# Patient Record
Sex: FEMALE | Race: BLACK OR AFRICAN AMERICAN | Employment: FULL TIME | URBAN - METROPOLITAN AREA
[De-identification: names, ages, dates, MRNs, and addresses within clinical notes are randomized per-mention and may not be internally consistent; named-entity substitution may affect disease eponyms.]

---

## 2017-06-04 ENCOUNTER — OP HISTORICAL/CONVERTED ENCOUNTER (OUTPATIENT)
Dept: OTHER | Age: 27
End: 2017-06-04

## 2017-06-12 ENCOUNTER — IP HISTORICAL/CONVERTED ENCOUNTER (OUTPATIENT)
Dept: OTHER | Age: 27
End: 2017-06-12

## 2017-09-05 ENCOUNTER — ED HISTORICAL/CONVERTED ENCOUNTER (OUTPATIENT)
Dept: OTHER | Age: 27
End: 2017-09-05

## 2018-01-08 ENCOUNTER — ED HISTORICAL/CONVERTED ENCOUNTER (OUTPATIENT)
Dept: OTHER | Age: 28
End: 2018-01-08

## 2018-06-11 ENCOUNTER — ED HISTORICAL/CONVERTED ENCOUNTER (OUTPATIENT)
Dept: OTHER | Age: 28
End: 2018-06-11

## 2018-10-14 ENCOUNTER — ED HISTORICAL/CONVERTED ENCOUNTER (OUTPATIENT)
Dept: OTHER | Age: 28
End: 2018-10-14

## 2019-07-02 ENCOUNTER — ED HISTORICAL/CONVERTED ENCOUNTER (OUTPATIENT)
Dept: OTHER | Age: 29
End: 2019-07-02

## 2019-07-03 ENCOUNTER — ED HISTORICAL/CONVERTED ENCOUNTER (OUTPATIENT)
Dept: OTHER | Age: 29
End: 2019-07-03

## 2019-10-25 ENCOUNTER — ED HISTORICAL/CONVERTED ENCOUNTER (OUTPATIENT)
Dept: OTHER | Age: 29
End: 2019-10-25

## 2020-01-15 ENCOUNTER — ED HISTORICAL/CONVERTED ENCOUNTER (OUTPATIENT)
Dept: OTHER | Age: 30
End: 2020-01-15

## 2021-10-08 ENCOUNTER — HOSPITAL ENCOUNTER (EMERGENCY)
Age: 31
Discharge: HOME | End: 2021-10-08
Attending: FAMILY MEDICINE | Admitting: FAMILY MEDICINE
Payer: MEDICAID

## 2021-10-08 ENCOUNTER — APPOINTMENT (OUTPATIENT)
Dept: GENERAL RADIOLOGY | Age: 31
End: 2021-10-08
Attending: FAMILY MEDICINE
Payer: MEDICAID

## 2021-10-08 VITALS
SYSTOLIC BLOOD PRESSURE: 170 MMHG | BODY MASS INDEX: 24.16 KG/M2 | RESPIRATION RATE: 18 BRPM | TEMPERATURE: 98.1 F | DIASTOLIC BLOOD PRESSURE: 107 MMHG | OXYGEN SATURATION: 100 % | WEIGHT: 145 LBS | HEIGHT: 65 IN | HEART RATE: 89 BPM

## 2021-10-08 DIAGNOSIS — J01.10 ACUTE NON-RECURRENT FRONTAL SINUSITIS: Primary | ICD-10-CM

## 2021-10-08 LAB
DEPRECATED S PYO AG THROAT QL EIA: NEGATIVE
FLUAV AG NPH QL IA: NEGATIVE
FLUBV AG NOSE QL IA: NEGATIVE
HCG UR QL: NEGATIVE

## 2021-10-08 PROCEDURE — 87880 STREP A ASSAY W/OPTIC: CPT

## 2021-10-08 PROCEDURE — 87070 CULTURE OTHR SPECIMN AEROBIC: CPT

## 2021-10-08 PROCEDURE — 87804 INFLUENZA ASSAY W/OPTIC: CPT

## 2021-10-08 PROCEDURE — 99283 EMERGENCY DEPT VISIT LOW MDM: CPT

## 2021-10-08 PROCEDURE — 81025 URINE PREGNANCY TEST: CPT

## 2021-10-08 RX ORDER — FLUTICASONE PROPIONATE 50 MCG
2 SPRAY, SUSPENSION (ML) NASAL DAILY
Qty: 1 EACH | Refills: 0 | Status: SHIPPED | OUTPATIENT
Start: 2021-10-08

## 2021-10-08 RX ORDER — CLONIDINE HYDROCHLORIDE 0.1 MG/1
0.1 TABLET ORAL
Status: DISCONTINUED | OUTPATIENT
Start: 2021-10-08 | End: 2021-10-08

## 2021-10-08 RX ORDER — AMOXICILLIN 500 MG/1
500 TABLET, FILM COATED ORAL 3 TIMES DAILY
Qty: 15 TABLET | Refills: 0 | Status: SHIPPED | OUTPATIENT
Start: 2021-10-08 | End: 2021-10-13

## 2021-10-08 NOTE — ED PROVIDER NOTES
EMERGENCY DEPARTMENT HISTORY AND PHYSICAL EXAM      Date: 10/8/2021  Patient Name: Yoan Carrasquillo    History of Presenting Illness     Chief Complaint   Patient presents with    Nasal Congestion    Chest Congestion    Sore Throat    Ear Pain    Generalized Body Aches       History Provided By: Patient    HPI: Yoan Carrasquillo, 31 y.o. female presents to the ED with cc of flulike symptoms. Began yesterday. She reports having nasal congestion, nonproductive coughing, sore throat, stuffy ears, sinus pain and pressure, generalized body aches. She recently been tested for Covid and was negative. She was in taking Tylenol Cold and sinus since yesterday but provides no relief. Notes symptoms has not worsened since onset. Other contacts in the house with similar symptoms but they to have been tested negative for Covid. She had 1 of 2 the Covid vaccinations. No loss of taste and smell, diarrhea, dyspnea, chest pain, loss of hearing, headache, dizziness, and all other symptoms are negative. There are no other complaints, changes, or physical findings at this time. PCP: None    No current facility-administered medications on file prior to encounter. No current outpatient medications on file prior to encounter. Past History     Past Medical History:  Past Medical History:   Diagnosis Date    Hypertension        Past Surgical History:  Past Surgical History:   Procedure Laterality Date    HX GYN      C sections x 5       Family History:  History reviewed. No pertinent family history. Social History:  Social History     Tobacco Use    Smoking status: Current Every Day Smoker     Packs/day: 0.50    Smokeless tobacco: Never Used   Substance Use Topics    Alcohol use: Yes     Comment: weekend    Drug use: Yes     Types: Marijuana     Comment: every other day       Allergies:  No Known Allergies      Review of Systems     Review of Systems   Constitutional: Negative for chills and fever.    HENT: Positive for ear pain, rhinorrhea, sinus pressure, sinus pain, sneezing and sore throat. Negative for congestion. Eyes: Negative for photophobia and visual disturbance. Respiratory: Positive for cough. Negative for shortness of breath. Cardiovascular: Negative for chest pain and palpitations. Gastrointestinal: Negative for nausea and vomiting. Genitourinary: Negative for dysuria and flank pain. Musculoskeletal: Negative for arthralgias, back pain and myalgias. Skin: Negative for rash and wound. Allergic/Immunologic: Negative for environmental allergies and food allergies. Neurological: Negative for light-headedness and headaches. All other systems reviewed and are negative. Physical Exam     Physical Exam  Vitals and nursing note reviewed. Constitutional:       Appearance: Normal appearance. She is normal weight. HENT:      Head: Normocephalic and atraumatic. Right Ear: Tympanic membrane, ear canal and external ear normal.      Left Ear: Tympanic membrane, ear canal and external ear normal.      Nose: Congestion present. No rhinorrhea. Right Sinus: Maxillary sinus tenderness and frontal sinus tenderness present. Left Sinus: Maxillary sinus tenderness and frontal sinus tenderness present. Mouth/Throat:      Mouth: Mucous membranes are moist.      Pharynx: Oropharynx is clear. No oropharyngeal exudate or posterior oropharyngeal erythema. Eyes:      Extraocular Movements: Extraocular movements intact. Conjunctiva/sclera: Conjunctivae normal.   Cardiovascular:      Rate and Rhythm: Normal rate and regular rhythm. Pulses: Normal pulses. Heart sounds: Normal heart sounds. Pulmonary:      Effort: Pulmonary effort is normal.      Breath sounds: Normal breath sounds. Musculoskeletal:      Cervical back: Normal range of motion and neck supple. No rigidity. No muscular tenderness. Skin:     General: Skin is warm.       Capillary Refill: Capillary refill takes less than 2 seconds. Neurological:      General: No focal deficit present. Mental Status: She is alert and oriented to person, place, and time. Psychiatric:         Mood and Affect: Mood normal.         Behavior: Behavior normal.         Thought Content: Thought content normal.         Judgment: Judgment normal.         Lab and Diagnostic Study Results     Labs -     Recent Results (from the past 12 hour(s))   STREP AG SCREEN, GROUP A    Collection Time: 10/08/21 10:15 AM    Specimen: Throat   Result Value Ref Range    Group A Strep Ag ID Negative     HCG URINE, QL    Collection Time: 10/08/21 10:15 AM   Result Value Ref Range    HCG urine, QL Negative Negative         Radiologic Studies -   @lastxrresult@  CT Results  (Last 48 hours)    None        CXR Results  (Last 48 hours)    None            Medical Decision Making   - I am the first provider for this patient. - I reviewed the vital signs, available nursing notes, past medical history, past surgical history, family history and social history. - Initial assessment performed. The patients presenting problems have been discussed, and they are in agreement with the care plan formulated and outlined with them. I have encouraged them to ask questions as they arise throughout their visit. Vital Signs-Reviewed the patient's vital signs. Patient Vitals for the past 12 hrs:   Temp Pulse Resp BP SpO2   10/08/21 0946 98.1 °F (36.7 °C) 89 18 (!) 170/107 100 %       Records Reviewed: Nursing Notes    ED Course:          Provider Notes (Medical Decision Making):     MDM  Number of Diagnoses or Management Options  Risk of Complications, Morbidity, and/or Mortality  General comments: 10:39 AM  Patient is stable with no marked toxicity or distress. Per H&P is likely upper respiratory viral illness versus sinusitis. All questions were answered and there are no apparent barriers to comprehension or communication.  The patient verbalized agreement with the diagnosis, treatment plan, and understanding of the follow-up instructions. The patient is appropriate for discharge; leaves the Emergency Department walking with a stable gait. Patient understands to return to the ED in 12-24 hours for any new or worsening symptoms or no  expected timely resolution of symptoms on mediations prescribed. Procedures   Medical Decision Makingedical Decision Making  Performed by: Jordan Primrose, DO  PROCEDURES:  Procedures       Disposition   Disposition: Condition stable  DC- Adult Discharges: All of the diagnostic tests were reviewed and questions answered. Diagnosis, care plan and treatment options were discussed. The patient understands the instructions and will follow up as directed. The patients results have been reviewed with them. They have been counseled regarding their diagnosis. The patient verbally convey understanding and agreement of the signs, symptoms, diagnosis, treatment and prognosis and additionally agrees to follow up as recommended with their PCP in 24 - 48 hours. They also agree with the care-plan and convey that all of their questions have been answered. I have also put together some discharge instructions for them that include: 1) educational information regarding their diagnosis, 2) how to care for their diagnosis at home, as well a 3) list of reasons why they would want to return to the ED prior to their follow-up appointment, should their condition change. Discharged    DISCHARGE PLAN:  1. There are no discharge medications for this patient. 2.   Follow-up Information     Follow up With Specialties Details Why Vandana Rai MD Family Medicine In 3 days If symptoms worsen [address] [facility] [address]  [phone]          3. Return to ED if worse   4.    Current Discharge Medication List      START taking these medications    Details   amoxicillin 500 mg tab Take 500 mg by mouth three (3) times daily for 5 days. Qty: 15 Tablet, Refills: 0  Start date: 10/8/2021, End date: 10/13/2021      fluticasone propionate (FLONASE) 50 mcg/actuation nasal spray 2 Sprays by Both Nostrils route daily. Qty: 1 Each, Refills: 0  Start date: 10/8/2021               Diagnosis     Clinical Impression:   1. Acute non-recurrent frontal sinusitis        Attestations:    Sesar Barnett, DO    Please note that this dictation was completed with [employer], the [employer] voice recognition software. Quite often unanticipated grammatical, syntax, homophones, and other interpretive errors are inadvertently transcribed by the computer software. Please disregard these errors. Please excuse any errors that have escaped final proofreading. Thank you.

## 2021-10-08 NOTE — ED TRIAGE NOTES
Kids were sick 1 wk ago, pt symptoms started today non productive cough, body aches, sore throat, chest and nasal congestion. Tested negative for Friday last week.   Still has smell and taste, no N, V, or diarrhea

## 2021-10-08 NOTE — LETTER
[address] [facility] EMERGENCY DEPARTMENT  [address] [facility] [address]  [phone]    Work/School Note    Date: 10/8/2021    To Whom It May concern:    Deepthi Argueta was seen and treated today in the emergency room by the following provider(s):  Attending Provider: Julio Leo can return to work on 10-    Sincerely,          Francisco Javier Montoya RN

## 2021-10-09 LAB
BACTERIA SPEC CULT: NORMAL
SPECIAL REQUESTS,SREQ: NORMAL

## 2022-02-12 ENCOUNTER — HOSPITAL ENCOUNTER (EMERGENCY)
Age: 32
Discharge: HOME | End: 2022-02-12
Attending: EMERGENCY MEDICINE | Admitting: EMERGENCY MEDICINE
Payer: MEDICAID

## 2022-02-12 VITALS
TEMPERATURE: 98.5 F | RESPIRATION RATE: 18 BRPM | SYSTOLIC BLOOD PRESSURE: 169 MMHG | OXYGEN SATURATION: 100 % | HEART RATE: 71 BPM | DIASTOLIC BLOOD PRESSURE: 114 MMHG

## 2022-02-12 DIAGNOSIS — M75.51 ACUTE BURSITIS OF RIGHT SHOULDER: Primary | ICD-10-CM

## 2022-02-12 PROCEDURE — 74011250637 HC RX REV CODE- 250/637: Performed by: EMERGENCY MEDICINE

## 2022-02-12 PROCEDURE — 74011250636 HC RX REV CODE- 250/636: Performed by: EMERGENCY MEDICINE

## 2022-02-12 PROCEDURE — 99282 EMERGENCY DEPT VISIT SF MDM: CPT

## 2022-02-12 PROCEDURE — 96372 THER/PROPH/DIAG INJ SC/IM: CPT

## 2022-02-12 RX ORDER — KETOROLAC TROMETHAMINE 30 MG/ML
60 INJECTION, SOLUTION INTRAMUSCULAR; INTRAVENOUS
Status: COMPLETED | OUTPATIENT
Start: 2022-02-12 | End: 2022-02-12

## 2022-02-12 RX ORDER — IBUPROFEN 800 MG/1
800 TABLET ORAL
Qty: 20 TABLET | Refills: 0 | Status: SHIPPED | OUTPATIENT
Start: 2022-02-12 | End: 2022-02-19

## 2022-02-12 RX ORDER — METOPROLOL TARTRATE 50 MG/1
50 TABLET ORAL
Status: COMPLETED | OUTPATIENT
Start: 2022-02-12 | End: 2022-02-12

## 2022-02-12 RX ADMIN — KETOROLAC TROMETHAMINE 60 MG: 30 INJECTION, SOLUTION INTRAMUSCULAR at 22:33

## 2022-02-12 RX ADMIN — METOPROLOL TARTRATE 50 MG: 50 TABLET, FILM COATED ORAL at 22:34

## 2022-02-12 NOTE — Clinical Note
[address]  [facility] EMERGENCY DEPT  [address] [address]  [phone]    Work/School Note    Date: 2/12/2022    To Whom It May concern:    Francoise Riley was seen and treated today in the emergency room by the following provider(s):  Attending Provider: Irma Zimmerman MD.      Francoise Riley is excused from work/school on 02/12/22 and 02/13/22. She is medically clear to return to work/school on 2/14/2022.        Sincerely,          Benjie Johnston MD
[address]  [facility] EMERGENCY DEPT  [address] [address]  [phone]    Work/School Note    Date: 2/12/2022    To Whom It May concern:    Joon Kim was seen and treated today in the emergency room by the following provider(s):  Attending Provider: Marielos Garner MD.      Joon Kim is excused from work/school on 02/12/22 and 02/13/22. She is medically clear to return to work/school on 2/14/2022.        Sincerely,          Antonio Giron RN
Adequate: hears normal conversation without difficulty

## 2022-02-13 NOTE — ED PROVIDER NOTES
Patient presents with complaint of right shoulder pain for 2 days . Pain is moderate. Worse with movement or palpation . No relief with OTC Tylenol. Past Medical History:   Diagnosis Date    Hypertension        Past Surgical History:   Procedure Laterality Date    HX GYN      C sections x 5         No family history on file. Social History     Socioeconomic History    Marital status: SINGLE     Spouse name: Not on file    Number of children: Not on file    Years of education: Not on file    Highest education level: Not on file   Occupational History    Not on file   Tobacco Use    Smoking status: Current Every Day Smoker     Packs/day: 0.50    Smokeless tobacco: Never Used   Substance and Sexual Activity    Alcohol use: Yes     Comment: weekend    Drug use: Yes     Types: Marijuana     Comment: every other day    Sexual activity: Not on file   Other Topics Concern    Not on file   Social History Narrative    Not on file     Social Determinants of Health     Financial Resource Strain:     Difficulty of Paying Living Expenses: Not on file   Food Insecurity:     Worried About Running Out of Food in the Last Year: Not on file    Danielle of Food in the Last Year: Not on file   Transportation Needs:     Lack of Transportation (Medical): Not on file    Lack of Transportation (Non-Medical):  Not on file   Physical Activity:     Days of Exercise per Week: Not on file    Minutes of Exercise per Session: Not on file   Stress:     Feeling of Stress : Not on file   Social Connections:     Frequency of Communication with Friends and Family: Not on file    Frequency of Social Gatherings with Friends and Family: Not on file    Attends Religion Services: Not on file    Active Member of Clubs or Organizations: Not on file    Attends Club or Organization Meetings: Not on file    Marital Status: Not on file   Intimate Partner Violence:     Fear of Current or Ex-Partner: Not on file   [employer] Emotionally Abused: Not on file    Physically Abused: Not on file    Sexually Abused: Not on file   Housing Stability:     Unable to Pay for Housing in the Last Year: Not on file    Number of Places Lived in the Last Year: Not on file    Unstable Housing in the Last Year: Not on file         ALLERGIES: Patient has no known allergies. Review of Systems   Constitutional: Negative. HENT: Negative. Eyes: Negative. Respiratory: Negative. Cardiovascular: Negative. Gastrointestinal: Negative. Endocrine: Negative. Genitourinary: Negative. Musculoskeletal:        Right shoulder pain. Skin: Negative. Allergic/Immunologic: Negative. Neurological: Negative. Hematological: Negative. Psychiatric/Behavioral: Negative. All other systems reviewed and are negative. Vitals:    02/12/22 2212   BP: (!) 174/118   Pulse: 88   Resp: 18   Temp: 98.5 °F (36.9 °C)   SpO2: 100%            Physical Exam  Vitals and nursing note reviewed. Constitutional:       Appearance: She is well-developed. HENT:      Head: Normocephalic and atraumatic. Cardiovascular:      Rate and Rhythm: Normal rate and regular rhythm. Heart sounds: Normal heart sounds. Pulmonary:      Breath sounds: Normal breath sounds. Abdominal:      General: Bowel sounds are normal.      Palpations: Abdomen is soft. Musculoskeletal:         General: Tenderness present. No swelling or deformity. Normal range of motion. Cervical back: Normal range of motion and neck supple. Comments: Right shoulder tender to palpation with mild decreased ROM. Neurological:      General: No focal deficit present. Mental Status: She is alert.    Psychiatric:         Mood and Affect: Mood normal.         Behavior: Behavior normal.          MDM       Procedures

## 2022-02-13 NOTE — ED NOTES
Nurse asked Dr if pt is ok to go home with 169/114 BP. Per . Gil Oswald is ok to go home as she received med to help with it and still will kick in.

## 2023-05-16 RX ORDER — FLUTICASONE PROPIONATE 50 MCG
2 SPRAY, SUSPENSION (ML) NASAL DAILY
COMMUNITY
Start: 2021-10-08

## 2023-07-03 ENCOUNTER — HOSPITAL ENCOUNTER (EMERGENCY)
Facility: HOSPITAL | Age: 33
Discharge: HOME | End: 2023-07-03
Payer: OTHER MISCELLANEOUS

## 2023-07-03 VITALS
HEART RATE: 72 BPM | SYSTOLIC BLOOD PRESSURE: 179 MMHG | RESPIRATION RATE: 20 BRPM | BODY MASS INDEX: 27.31 KG/M2 | WEIGHT: 160 LBS | DIASTOLIC BLOOD PRESSURE: 113 MMHG | HEIGHT: 64 IN | OXYGEN SATURATION: 99 % | TEMPERATURE: 98.6 F
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
## 2023-07-03 DIAGNOSIS — S39.012A STRAIN OF LUMBAR REGION, INITIAL ENCOUNTER: ICD-10-CM

## 2023-07-03 DIAGNOSIS — S16.1XXA STRAIN OF NECK MUSCLE, INITIAL ENCOUNTER: ICD-10-CM

## 2023-07-03 PROCEDURE — 6370000000 HC RX 637 (ALT 250 FOR IP)

## 2023-07-03 PROCEDURE — 99283 EMERGENCY DEPT VISIT LOW MDM: CPT

## 2023-07-03 RX ORDER — IBUPROFEN 600 MG/1
600 TABLET ORAL
Status: COMPLETED | OUTPATIENT
Start: 2023-07-03 | End: 2023-07-03

## 2023-07-03 RX ORDER — ACETAMINOPHEN 500 MG
1000 TABLET ORAL
Status: COMPLETED | OUTPATIENT
Start: 2023-07-03 | End: 2023-07-03

## 2023-07-03 RX ORDER — CYCLOBENZAPRINE HCL 10 MG
10 TABLET ORAL 2 TIMES DAILY PRN
Qty: 10 TABLET | Refills: 0 | Status: SHIPPED | OUTPATIENT
Start: 2023-07-03 | End: 2023-07-08

## 2023-07-03 RX ORDER — IBUPROFEN 600 MG/1
600 TABLET ORAL EVERY 6 HOURS PRN
Qty: 20 TABLET | Refills: 0 | Status: SHIPPED | OUTPATIENT
Start: 2023-07-03 | End: 2023-07-08

## 2023-07-03 RX ORDER — CYCLOBENZAPRINE HCL 10 MG
10 TABLET ORAL
Status: COMPLETED | OUTPATIENT
Start: 2023-07-03 | End: 2023-07-03

## 2023-07-03 RX ORDER — ACETAMINOPHEN 500 MG
1000 TABLET ORAL EVERY 6 HOURS PRN
Qty: 40 TABLET | Refills: 0 | Status: SHIPPED | OUTPATIENT
Start: 2023-07-03 | End: 2023-07-08

## 2023-07-03 RX ADMIN — IBUPROFEN 600 MG: 600 TABLET, FILM COATED ORAL at 20:51

## 2023-07-03 RX ADMIN — ACETAMINOPHEN 1000 MG: 500 TABLET ORAL at 20:51

## 2023-07-03 RX ADMIN — CYCLOBENZAPRINE 10 MG: 10 TABLET, FILM COATED ORAL at 20:51
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
## 2023-07-04 NOTE — ED NOTES
C collar removed by provider. Patient A/O, NAD, respirations even and unlabored. Patient educated by Physician on blood pressure monitoring and following up with PCP.  Patient verbalized understanding     Jefry Gilliam RN  07/03/23 4276

## 2024-07-31 ENCOUNTER — HOSPITAL ENCOUNTER (EMERGENCY)
Facility: HOSPITAL | Age: 34
Discharge: HOME | End: 2024-07-31
Attending: STUDENT IN AN ORGANIZED HEALTH CARE EDUCATION/TRAINING PROGRAM
Payer: MEDICAID

## 2024-07-31 VITALS
OXYGEN SATURATION: 100 % | SYSTOLIC BLOOD PRESSURE: 163 MMHG | TEMPERATURE: 99.2 F | HEIGHT: 65 IN | BODY MASS INDEX: 26.16 KG/M2 | HEART RATE: 97 BPM | RESPIRATION RATE: 18 BRPM | DIASTOLIC BLOOD PRESSURE: 98 MMHG | WEIGHT: 157 LBS

## 2024-07-31 DIAGNOSIS — G56.00 MEDIAN NERVE COMPRESSION: Primary | ICD-10-CM

## 2024-07-31 PROCEDURE — 6370000000 HC RX 637 (ALT 250 FOR IP): Performed by: STUDENT IN AN ORGANIZED HEALTH CARE EDUCATION/TRAINING PROGRAM

## 2024-07-31 PROCEDURE — 99283 EMERGENCY DEPT VISIT LOW MDM: CPT

## 2024-07-31 RX ORDER — ACETAMINOPHEN 325 MG/1
650 TABLET ORAL
Status: COMPLETED | OUTPATIENT
Start: 2024-07-31 | End: 2024-07-31

## 2024-07-31 RX ORDER — IBUPROFEN 400 MG/1
400 TABLET ORAL EVERY 6 HOURS PRN
Qty: 56 TABLET | Refills: 0 | Status: SHIPPED | OUTPATIENT
Start: 2024-07-31 | End: 2024-08-14

## 2024-07-31 RX ORDER — IBUPROFEN 400 MG/1
400 TABLET ORAL
Status: COMPLETED | OUTPATIENT
Start: 2024-07-31 | End: 2024-07-31

## 2024-07-31 RX ORDER — PREDNISONE 20 MG/1
40 TABLET ORAL ONCE
Status: COMPLETED | OUTPATIENT
Start: 2024-07-31 | End: 2024-07-31

## 2024-07-31 RX ORDER — PREDNISONE 50 MG/1
50 TABLET ORAL DAILY
Qty: 5 TABLET | Refills: 0 | Status: SHIPPED | OUTPATIENT
Start: 2024-07-31 | End: 2024-08-05

## 2024-07-31 RX ORDER — ACETAMINOPHEN 500 MG
1000 TABLET ORAL 3 TIMES DAILY
Qty: 84 TABLET | Refills: 0 | Status: SHIPPED | OUTPATIENT
Start: 2024-07-31 | End: 2024-08-14

## 2024-07-31 RX ORDER — CETIRIZINE HYDROCHLORIDE 10 MG/1
10 TABLET ORAL DAILY
COMMUNITY

## 2024-07-31 RX ADMIN — PREDNISONE 40 MG: 20 TABLET ORAL at 16:46

## 2024-07-31 RX ADMIN — ACETAMINOPHEN 650 MG: 325 TABLET ORAL at 16:46

## 2024-07-31 RX ADMIN — IBUPROFEN 400 MG: 400 TABLET, FILM COATED ORAL at 16:45

## 2024-07-31 ASSESSMENT — PAIN DESCRIPTION - LOCATION: LOCATION: ARM

## 2024-07-31 ASSESSMENT — PAIN DESCRIPTION - ORIENTATION: ORIENTATION: LEFT

## 2024-07-31 ASSESSMENT — PAIN DESCRIPTION - DESCRIPTORS: DESCRIPTORS: TINGLING

## 2024-07-31 ASSESSMENT — PAIN - FUNCTIONAL ASSESSMENT: PAIN_FUNCTIONAL_ASSESSMENT: 0-10

## 2024-07-31 ASSESSMENT — LIFESTYLE VARIABLES
HOW OFTEN DO YOU HAVE A DRINK CONTAINING ALCOHOL: 2-4 TIMES A MONTH
HOW MANY STANDARD DRINKS CONTAINING ALCOHOL DO YOU HAVE ON A TYPICAL DAY: 1 OR 2

## 2024-07-31 ASSESSMENT — PAIN SCALES - GENERAL: PAINLEVEL_OUTOF10: 8

## 2024-07-31 NOTE — ED PROVIDER NOTES
RD - P [phone] - F [phone]  [address] , [facility] [address]      Phone: [phone]   acetaminophen 500 MG tablet  ibuprofen 400 MG tablet  predniSONE 50 MG tablet       5. Discontinued Medications:   Discharge Medication List as of 7/31/2024  4:47 PM          Procedures     Unless otherwise noted below, none.    Performed by: Brenden Bronson MD   Procedures      Critical Care Time     Critical care billing criteria and/or time were NOT met.    Documentation     I am the Primary Clinician of Record: Brenden Bronson MD (electronically signed)    (Please note that parts of this dictation were completed with voice recognition software. Quite often unanticipated grammatical, syntax, homophones, and other interpretive errors are inadvertently transcribed by the computer software. Please disregards these errors. Please excuse any errors that have escaped final proofreading.)       Brenden Bronson MD  07/31/24 7535

## 2024-07-31 NOTE — ED TRIAGE NOTES
Patient reporting on and off, left arm numbness/tingling  x 3 weeks.  Numbness/tingling radiates from shoulder, to elbow, to 2nd finger.     Patients believes she slept on it wrong and has a pinched a nerve.

## 2024-07-31 NOTE — DISCHARGE INSTRUCTIONS
Thank you!    Thank you for allowing me to care for you in the emergency department.  I sincerely hope that you are satisfied with your visit today.  It is my goal to provide you with excellent care.    Below you will find a list of your labs and imaging from your visit today if applicable. Should you have any questions regarding these results please do not hesitate to call the emergency department. Please review [employer] for a more detailed result list since the below list may not be comprehensive. Instructions on how to sign up to [employer] should be provided in this packet.    Labs -   No results found for this or any previous visit (from the past 12 hour(s)).    Radiologic Studies -   No orders to display     [unfilled]  [unfilled]       If you feel that you have not received excellent quality care or timely care, please ask to speak to the nurse manager. Please choose us in the future for your continued health care needs.   ------------------------------------------------------------------------------------------------------------  The exam and treatment you received in the Emergency Department were for an urgent problem and are not intended as complete care. It is very important that you follow-up with a doctor, nurse practitioner, or physician assistant in a timely manner to:  (1) confirm your diagnosis and review all imaging and lab results,  (2) re-evaluation of changes in your illness and treatment, and  (3) for ongoing care.  If your symptoms become worse or you do not improve as expected and you are unable to reach your usual health care provider, you should return to the Emergency Department. We are available 24 hours a day.     Please take your discharge instructions with you when you go to your follow-up appointment.     If a prescription has been provided, please have it filled as soon as possible to prevent a delay in treatment. Read the entire medication instruction sheet provided to you by the pharmacy. If

## 2024-08-19 ENCOUNTER — HOSPITAL ENCOUNTER (EMERGENCY)
Facility: HOSPITAL | Age: 34
Discharge: HOME | End: 2024-08-19

## 2024-08-19 VITALS
HEIGHT: 65 IN | DIASTOLIC BLOOD PRESSURE: 99 MMHG | WEIGHT: 157 LBS | RESPIRATION RATE: 17 BRPM | SYSTOLIC BLOOD PRESSURE: 156 MMHG | HEART RATE: 97 BPM | BODY MASS INDEX: 26.16 KG/M2 | OXYGEN SATURATION: 98 % | TEMPERATURE: 98.3 F

## 2024-08-19 DIAGNOSIS — Z11.52 ENCOUNTER FOR SCREENING FOR COVID-19: Primary | ICD-10-CM

## 2024-08-19 DIAGNOSIS — J06.9 VIRAL URI WITH COUGH: ICD-10-CM

## 2024-08-19 LAB
SARS-COV-2 RNA RESP QL NAA+PROBE: NOT DETECTED
SPECIMEN SOURCE: NORMAL

## 2024-08-19 PROCEDURE — 99283 EMERGENCY DEPT VISIT LOW MDM: CPT

## 2024-08-19 PROCEDURE — 87635 SARS-COV-2 COVID-19 AMP PRB: CPT

## 2024-08-19 RX ORDER — ACETAMINOPHEN 500 MG
1000 TABLET ORAL 4 TIMES DAILY PRN
Qty: 40 TABLET | Refills: 0 | Status: SHIPPED | OUTPATIENT
Start: 2024-08-19

## 2024-08-19 RX ORDER — ONDANSETRON 4 MG/1
4 TABLET, ORALLY DISINTEGRATING ORAL EVERY 8 HOURS PRN
Qty: 12 TABLET | Refills: 0 | Status: SHIPPED | OUTPATIENT
Start: 2024-08-19

## 2024-08-19 RX ORDER — IBUPROFEN 800 MG/1
800 TABLET ORAL EVERY 8 HOURS PRN
Qty: 20 TABLET | Refills: 0 | Status: SHIPPED | OUTPATIENT
Start: 2024-08-19

## 2024-08-19 RX ORDER — DEXTROMETHORPHAN HYDROBROMIDE AND PROMETHAZINE HYDROCHLORIDE 15; 6.25 MG/5ML; MG/5ML
2.5 SYRUP ORAL 4 TIMES DAILY PRN
Qty: 40 ML | Refills: 0 | Status: SHIPPED | OUTPATIENT
Start: 2024-08-19 | End: 2024-08-26

## 2024-08-19 ASSESSMENT — PAIN SCALES - GENERAL: PAINLEVEL_OUTOF10: 8

## 2024-08-19 ASSESSMENT — PAIN DESCRIPTION - DESCRIPTORS: DESCRIPTORS: ACHING

## 2024-08-19 ASSESSMENT — PAIN DESCRIPTION - LOCATION: LOCATION: HEAD

## 2024-08-19 ASSESSMENT — PAIN - FUNCTIONAL ASSESSMENT: PAIN_FUNCTIONAL_ASSESSMENT: 0-10

## 2024-08-19 ASSESSMENT — PAIN DESCRIPTION - ORIENTATION: ORIENTATION: ANTERIOR;POSTERIOR

## 2024-08-19 NOTE — ED PROVIDER NOTES
bronchitis, bacterial sinusitis vs. pharyngitis, migraine, flu.     Symptomatic therapy suggested: acetaminophen, ibuprofen, antihistamine-decongestant of choice, cough suppressant of choice. Increase fluids, use vaporizer, stay in steamy bathroom tid 15 min prn severe cough, tylenol as needed, rest, avoid smoky areas. Lack of antibiotic effectiveness discussed with her. Symptomatic therapy suggested: gargle for sore throat, use mist at bedside for congestion.  Apply facial warm packs for sinus pain or use nasal saline sprays. Follow up prn if not better in 72 hours.      Records Reviewed (source and summary of external notes): Prior medical records and Nursing notes    Vitals:    Vitals:    08/19/24 1558   BP: (!) 156/99   Pulse: 97   Resp: 17   Temp: 98.3 °F (36.8 °C)   TempSrc: Oral   SpO2: 98%   Weight: 71.2 kg (157 lb)   Height: 1.638 m (5' 4.5\")        ED COURSE  Routine discharge counseling was given including the fact that any worsening, changing or persistent symptoms should prompt an immediate call or follow up with their primary physician or the emergency department. The importance of appropriate follow up was also discussed. More extensive discharge instructions were given in the patient's discharge paperwork.     After completion of evaluation and discussion of results and diagnoses, all the questions were answered. If required, all follow up appointments and treatments were discussed and explained. Understanding was insured prior to discharge.         SEPSIS Reassessment: Sepsis reassessment not applicable    Patient was given the following medications:  Medications - No data to display    CONSULTS: See ED Course/MDM for further details.  None     Social Determinants affecting Diagnosis/Treatment: Patient lacks a PCP. Given PCP/Free clinic resources.    Smoking Cessation: Smoking Cessation Counseling  Discussed the risks of smoking tobacco products and the long term sequelae of tobacco use with the

## 2024-08-19 NOTE — DISCHARGE INSTRUCTIONS
PRIMARY CARE in [facility]:  [address] [place] [address].  [phone]  Claudia Alonso MD Family Medicine  Dhruv Oliva MD Family Medicine  Brian Love MD Family Medicine    [facility]: [address] [place] [address]. [phone]   Zaki Ramirez MD Family Medicine  Saurabh Kevin, [place] Family Medicine  Eleni Wright, [place] Family Medicine    [facility]: [address], [address], [place] [address]. [phone]  Amber Das MD Family Medicine  Adore Rodrigez MD Family Medicine  Taqueria Arce, [place] Family Medicine  Marce Yip, [place] Family Medicine    [facility]: [address] [address]. [phone]  Leandra Luevano MD Internal Medicine  Brooke Blandon MD Internal Medicine  Freedom Ray MD Internal Medicine  Coral Romo, PA Family Medicine    [facility]: [address], [place] [address]. [phone]  Bailey Rueda MD Family Medicine  Isadora Foss MD Family Medicine  Mike Hyman, DO Infectious Disease  Roger Schroeder MD Family Medicine    [facility]: [address], [address], [place] [address]. [phone]  Rose Mary Pike,  Family Medicine  Tran Arce NP Internal Medicine  Ruth Rice, [place] Internal Medicine    [facility]: [address] [place], [place] [address]. [phone]  Martine Buckner MD Internal Medicine  Phoebe Durand MD Internal Medicine    Primary Care [facility]: [address] [place] [address]. [phone]  Yahaira Sullivan MD Family Medicine  Elizabeth Hennessy MD Family Medicine  Alcon Collado MD Family Medicine  Mishel Kaur, [place] Family Medicine  Florencio Arce, ARPN, CNP Family Medicine    [facility]

## 2024-08-19 NOTE — ED TRIAGE NOTES
Pt reports COVID like symptoms with the primary complaint of headache. Pt recently in contact with individuals that tested positive for COVID.

## 2024-12-15 ENCOUNTER — HOSPITAL ENCOUNTER (EMERGENCY)
Facility: HOSPITAL | Age: 34
Discharge: HOME | End: 2024-12-15
Payer: MEDICAID

## 2024-12-15 VITALS
HEART RATE: 76 BPM | HEIGHT: 64 IN | BODY MASS INDEX: 26.95 KG/M2 | SYSTOLIC BLOOD PRESSURE: 192 MMHG | DIASTOLIC BLOOD PRESSURE: 100 MMHG | RESPIRATION RATE: 16 BRPM | TEMPERATURE: 98.2 F | OXYGEN SATURATION: 100 %

## 2024-12-15 DIAGNOSIS — L30.1 DYSHIDROTIC ECZEMA: Primary | ICD-10-CM

## 2024-12-15 PROCEDURE — 99283 EMERGENCY DEPT VISIT LOW MDM: CPT

## 2024-12-15 RX ORDER — TRIAMCINOLONE ACETONIDE 5 MG/G
CREAM TOPICAL
Qty: 15 G | Refills: 0 | Status: SHIPPED | OUTPATIENT
Start: 2024-12-15

## 2024-12-15 RX ORDER — DIPHENHYDRAMINE HCL 25 MG
50 TABLET ORAL EVERY 6 HOURS PRN
Qty: 30 TABLET | Refills: 0 | Status: SHIPPED | OUTPATIENT
Start: 2024-12-15 | End: 2025-01-14

## 2024-12-15 RX ORDER — PREDNISONE 10 MG/1
TABLET ORAL
Qty: 42 TABLET | Refills: 0 | Status: SHIPPED | OUTPATIENT
Start: 2024-12-15

## 2024-12-15 ASSESSMENT — PAIN - FUNCTIONAL ASSESSMENT: PAIN_FUNCTIONAL_ASSESSMENT: 0-10

## 2024-12-15 ASSESSMENT — PAIN SCALES - GENERAL: PAINLEVEL_OUTOF10: 0

## 2024-12-15 NOTE — ED PROVIDER NOTES
Cessation: Not Applicable    PROCEDURES   Unless otherwise noted above, none.  Procedures      CRITICAL CARE TIME   Patient does not meet Critical Care Time, 0 minutes    ED IMPRESSION     1. Dyshidrotic eczema          DISPOSITION/PLAN   DISPOSITION Decision To Discharge 12/15/2024 01:53:15 PM   DISPOSITION CONDITION Stable        Discharge Note: The patient is stable for discharge home. The signs, symptoms, diagnosis, and discharge instructions have been discussed, understanding conveyed, and agreed upon. The patient is to follow up as recommended or return to ER should their symptoms worsen.      PATIENT REFERRED TO:  [facility]  [address] [place] [id number]  [phone]            DISCHARGE MEDICATIONS:     Medication List        START taking these medications      diphenhydrAMINE 25 MG tablet  Commonly known as: Benadryl Allergy  Take 2 tablets by mouth every 6 hours as needed for Itching or Allergies     predniSONE 10 MG tablet  Commonly known as: DELTASONE  Take 5tab(50mg)x3days, 4tab(40mg)x3days, 3tab(30mg)x3days, 2tab(20mg)x2days, 1tab(10mg)x2days. #42     triamcinolone 0.5 % cream  Commonly known as: ARISTOCORT  Apply topically 3 times daily.               Where to Get Your Medications        These medications were sent to [id number] [place], [place] - [address] - P [phone] - F [phone]  [facility] [facility] [address]      Phone: [phone]   diphenhydrAMINE 25 MG tablet  predniSONE 10 MG tablet  triamcinolone 0.5 % cream           DISCONTINUED MEDICATIONS:  Discharge Medication List as of 12/15/2024  1:58 PM        STOP taking these medications       acetaminophen (TYLENOL) 500 MG tablet Comments:   Reason for Stopping:         ibuprofen (ADVIL;MOTRIN) 800 MG tablet Comments:   Reason for Stopping:         ondansetron (ZOFRAN-ODT) 4 MG disintegrating tablet Comments:   Reason for Stopping:         cetirizine (ZYRTEC) 10 MG tablet

## 2025-01-29 ENCOUNTER — HOSPITAL ENCOUNTER (EMERGENCY)
Facility: HOSPITAL | Age: 35
Discharge: HOME | End: 2025-01-29
Payer: MEDICAID

## 2025-01-29 VITALS
BODY MASS INDEX: 27.83 KG/M2 | SYSTOLIC BLOOD PRESSURE: 176 MMHG | RESPIRATION RATE: 18 BRPM | TEMPERATURE: 97.9 F | HEIGHT: 64 IN | HEART RATE: 95 BPM | DIASTOLIC BLOOD PRESSURE: 112 MMHG | WEIGHT: 163 LBS | OXYGEN SATURATION: 99 %

## 2025-01-29 DIAGNOSIS — L30.1 DYSHIDROTIC ECZEMA: Primary | ICD-10-CM

## 2025-01-29 PROCEDURE — 99283 EMERGENCY DEPT VISIT LOW MDM: CPT

## 2025-01-29 RX ORDER — TRIAMCINOLONE ACETONIDE 5 MG/G
CREAM TOPICAL
Qty: 454 G | Refills: 0 | Status: SHIPPED | OUTPATIENT
Start: 2025-01-29

## 2025-01-29 RX ORDER — PREDNISONE 10 MG/1
TABLET ORAL
Qty: 42 TABLET | Refills: 0 | Status: SHIPPED | OUTPATIENT
Start: 2025-01-29

## 2025-01-29 ASSESSMENT — PAIN - FUNCTIONAL ASSESSMENT: PAIN_FUNCTIONAL_ASSESSMENT: 0-10

## 2025-01-29 ASSESSMENT — PAIN DESCRIPTION - LOCATION: LOCATION: FINGER (COMMENT WHICH ONE)

## 2025-01-29 ASSESSMENT — PAIN SCALES - GENERAL: PAINLEVEL_OUTOF10: 6

## 2025-01-29 ASSESSMENT — PAIN DESCRIPTION - ORIENTATION: ORIENTATION: LEFT

## 2025-01-29 NOTE — ED TRIAGE NOTES
States that she has something that looks like a pus filled blister on the side of her left ring finger about midway down.  Denies burn or penetrating injury to finger.  Reports pain also

## 2025-01-29 NOTE — ED PROVIDER NOTES
176/112   Pulse: 95   Resp: 18   Temp: 97.9 °F (36.6 °C)   TempSrc: Oral   SpO2: 99%   Weight: 73.9 kg (163 lb)   Height: 1.626 m (5' 4\")        ED COURSE       SEPSIS Reassessment: Patient does NOT meet Sepsis criteria after ED workup    Clinical Management Tools:  Not Applicable    Patient was given the following medications:  Medications - No data to display    CONSULTS: See ED Course/MDM for further details.  None     Social Determinants affecting Diagnosis/Treatment: None    Smoking Cessation: Not Applicable    PROCEDURES   Unless otherwise noted above, none.  Procedures      CRITICAL CARE TIME   Patient does not meet Critical Care Time, 0 minutes    ED IMPRESSION     1. Dyshidrotic eczema          DISPOSITION/PLAN   DISPOSITION Decision To Discharge 01/29/2025 04:32:49 PM   DISPOSITION CONDITION Stable        Discharge Note: The patient is stable for discharge home. The signs, symptoms, diagnosis, and discharge instructions have been discussed, understanding conveyed, and agreed upon. The patient is to follow up as recommended or return to ER should their symptoms worsen.      PATIENT REFERRED TO:  [facility]  [address] [place] [address] A  [place] [address]  [phone]  Schedule an appointment as soon as possible for a visit in 1 week  Follow-up for repeat evaluation and treatment recommendations for Dyshidrotic eczema    [facility] and [address], [place] [address]    Phone: [phone]  Schedule an appointment as soon as possible for a visit   OR  your PCP for repeat evaluation of        DISCHARGE MEDICATIONS:     Medication List        CONTINUE taking these medications      predniSONE 10 MG tablet  Commonly known as: DELTASONE  Take 5tab(50mg)x3days, 4tab(40mg)x3days, 3tab(30mg)x3days, 2tab(20mg)x2days, 1tab(10mg)x2days. #42     triamcinolone 0.5 % cream  Commonly known as: ARISTOCORT  Apply topically 3 times daily.               Where to Get

## 2025-01-29 NOTE — DISCHARGE INSTRUCTIONS
PRIMARY CARE in [facility]:  [address] [place] [address].  [phone]  Claudia Alonso MD Family Medicine  Dhruv Oliva MD Family Medicine  Brian Love MD Family Medicine    [facility]: [address] [place] [address]. [phone]   Zaki Ramirez MD Family Medicine  Saurabh Kevin, [facility] Family Medicine  Eleni Wright, [facility] Family Medicine    [facility] [place] [facility]: [address], [address], [place] [address]. [phone]  Amber Das MD Family Medicine  Adore Rodrigez MD Family Medicine  Taqueria Arce, [facility] Family Medicine  Marce Yip, [facility] Family Medicine    [facility]: [address] [facility] [address]. [phone]  Leandra Luevano MD Internal Medicine  Brooke Blandon MD Internal Medicine  Freedom Ray MD Internal Medicine  Coral Romo, PA Family Medicine    [facility]: [address], [place] [address]. [phone]  Bailey Rueda MD Family Medicine  Isadora Foss MD Family Medicine  Mike Hyman, DO Infectious Disease  Roger Schroeder MD Family Medicine    [facility]: [address], [address], [place] [address]. [phone]  Rose Mary Pike,  Family Medicine  Tran Arce NP Internal Medicine  Ruth Rice, [facility]KA Internal Medicine    [facility]: [address] [place], [place] [address]. [phone]  Martine Buckner MD Internal Medicine  Phoebe Durand MD Internal Medicine    Primary Care [facility]: [address] [place] [address]. [phone]  Yahaira Sullivan MD Family Medicine  Elizabeth Hennessy MD Family Medicine  Alcon Collado MD Family Medicine  Mishel Kaur, [facility] Family Medicine  Florencio Arce, ARPN, CNP Family Medicine    [facility]

## 2025-03-04 ENCOUNTER — TELEPHONE (OUTPATIENT)
Age: 35
End: 2025-03-04

## 2025-04-14 ENCOUNTER — APPOINTMENT (OUTPATIENT)
Facility: HOSPITAL | Age: 35
End: 2025-04-14
Payer: MEDICAID

## 2025-04-14 ENCOUNTER — HOSPITAL ENCOUNTER (EMERGENCY)
Facility: HOSPITAL | Age: 35
Discharge: HOME | End: 2025-04-14
Payer: MEDICAID

## 2025-04-14 VITALS
HEART RATE: 94 BPM | OXYGEN SATURATION: 100 % | HEIGHT: 60 IN | TEMPERATURE: 98.1 F | DIASTOLIC BLOOD PRESSURE: 119 MMHG | RESPIRATION RATE: 18 BRPM | SYSTOLIC BLOOD PRESSURE: 165 MMHG | BODY MASS INDEX: 30.23 KG/M2 | WEIGHT: 154 LBS
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
## 2025-04-14 DIAGNOSIS — M79.642 BILATERAL HAND PAIN: ICD-10-CM

## 2025-04-14 DIAGNOSIS — M79.641 BILATERAL HAND PAIN: ICD-10-CM

## 2025-04-14 DIAGNOSIS — M25.512 ACUTE PAIN OF LEFT SHOULDER: ICD-10-CM

## 2025-04-14 PROCEDURE — 72125 CT NECK SPINE W/O DYE: CPT

## 2025-04-14 PROCEDURE — 73090 X-RAY EXAM OF FOREARM: CPT

## 2025-04-14 PROCEDURE — 6370000000 HC RX 637 (ALT 250 FOR IP): Performed by: NURSE PRACTITIONER

## 2025-04-14 PROCEDURE — 73130 X-RAY EXAM OF HAND: CPT

## 2025-04-14 PROCEDURE — 99284 EMERGENCY DEPT VISIT MOD MDM: CPT

## 2025-04-14 PROCEDURE — 73030 X-RAY EXAM OF SHOULDER: CPT

## 2025-04-14 RX ORDER — PREDNISONE 50 MG/1
50 TABLET ORAL DAILY
Qty: 5 TABLET | Refills: 0 | Status: SHIPPED | OUTPATIENT
Start: 2025-04-14 | End: 2025-04-19

## 2025-04-14 RX ORDER — OXYCODONE AND ACETAMINOPHEN 5; 325 MG/1; MG/1
1 TABLET ORAL
Refills: 0 | Status: COMPLETED | OUTPATIENT
Start: 2025-04-14 | End: 2025-04-14

## 2025-04-14 RX ORDER — METHOCARBAMOL 750 MG/1
750 TABLET, FILM COATED ORAL 4 TIMES DAILY
Qty: 40 TABLET | Refills: 0 | Status: SHIPPED | OUTPATIENT
Start: 2025-04-14 | End: 2025-04-24

## 2025-04-14 RX ORDER — OXYCODONE AND ACETAMINOPHEN 5; 325 MG/1; MG/1
1 TABLET ORAL EVERY 6 HOURS PRN
Qty: 12 TABLET | Refills: 0 | Status: SHIPPED | OUTPATIENT
Start: 2025-04-14 | End: 2025-04-17

## 2025-04-14 RX ORDER — IBUPROFEN 600 MG/1
600 TABLET, FILM COATED ORAL EVERY 6 HOURS PRN
Qty: 28 TABLET | Refills: 0 | Status: SHIPPED | OUTPATIENT
Start: 2025-04-14 | End: 2025-04-21

## 2025-04-14 RX ORDER — METHOCARBAMOL 500 MG/1
1000 TABLET, FILM COATED ORAL ONCE
Status: COMPLETED | OUTPATIENT
Start: 2025-04-14 | End: 2025-04-14

## 2025-04-14 RX ORDER — PREDNISONE 20 MG/1
40 TABLET ORAL
Status: COMPLETED | OUTPATIENT
Start: 2025-04-14 | End: 2025-04-14

## 2025-04-14 RX ADMIN — OXYCODONE AND ACETAMINOPHEN 1 TABLET: 325; 5 TABLET ORAL at 14:24

## 2025-04-14 RX ADMIN — METHOCARBAMOL 1000 MG: 500 TABLET ORAL at 14:24

## 2025-04-14 RX ADMIN — PREDNISONE 40 MG: 20 TABLET ORAL at 14:24

## 2025-04-14 ASSESSMENT — PAIN - FUNCTIONAL ASSESSMENT: PAIN_FUNCTIONAL_ASSESSMENT: 0-10

## 2025-04-14 ASSESSMENT — PAIN DESCRIPTION - ORIENTATION: ORIENTATION: LEFT

## 2025-04-14 ASSESSMENT — PAIN SCALES - GENERAL: PAINLEVEL_OUTOF10: 9

## 2025-04-14 ASSESSMENT — PAIN DESCRIPTION - LOCATION: LOCATION: ARM;HAND

## 2025-04-14 ASSESSMENT — PAIN DESCRIPTION - DESCRIPTORS: DESCRIPTORS: STABBING;NUMBNESS

## 2025-04-14 NOTE — ED PROVIDER NOTES
[facility] EMERGENCY DEPT  EMERGENCY DEPARTMENT HISTORY AND PHYSICAL EXAM      Date of evaluation: 4/14/2025  Patient Name: Martita Arellano  Birthdate 1990  MRN: [id number]  ED Provider: MIKA Viramontes NP   Note Started: 2:13 PM EDT 4/14/25    HISTORY OF PRESENT ILLNESS     Chief Complaint   Patient presents with    Fall    Arm Pain       History Provided By: Patient, only     HPI: Martita Arellano is a 34 y.o. female past medical history reviewed as listed below presents after GLF just prior to arrival where she tripped over rocks in her driveway. She started falling backward and extended both her arms behind her to catch herself and initial impact was on both hands, left greater than right, and then fell onto back but did not hit her head or have LOC. Greatest pain is in her left hand, wrist, shoulder and left side neck with history of carpal tunnel in left wrist. Has sharp pain with tingling in her finger tips of second and third finger of left hand. Pain in right hand as well. Denies any back pain, hip pain, pelvic, pain, leg pain. No medications taken PTA. ROM is self-limited due to pain.     PAST MEDICAL HISTORY   Past Medical History:  Past Medical History:   Diagnosis Date    Carpal tunnel syndrome of left wrist     Hypertension        Past Surgical History:  Past Surgical History:   Procedure Laterality Date    GYN      C sections x 5       Family History:  History reviewed. No pertinent family history.    Social History:  Social History     Tobacco Use    Smoking status: Every Day     Current packs/day: 0.50     Types: Cigarettes    Smokeless tobacco: Never   Vaping Use    Vaping status: Never Used   Substance Use Topics    Alcohol use: Yes    Drug use: Yes     Types: Marijuana (Weed)       Allergies:  Allergies   Allergen Reactions    Tomato Hives       PCP: None, None    Current Meds:   No current facility-administered medications for this encounter.     Current Outpatient Medications   Medication Sig

## 2025-04-14 NOTE — ED TRIAGE NOTES
Pt ambulatory to triage c/o GLF after tripping over the [place], states she fell on her back and caught herself with both arms.  Now c/o left arm and hand pain with \"numbness\" in finger tips.  Denies hitting head, blood thinners, or LOC.

## 2025-06-26 ENCOUNTER — HOSPITAL ENCOUNTER (EMERGENCY)
Facility: HOSPITAL | Age: 35
Discharge: HOME | End: 2025-06-26
Attending: EMERGENCY MEDICINE
Payer: MEDICAID

## 2025-06-26 VITALS
SYSTOLIC BLOOD PRESSURE: 177 MMHG | TEMPERATURE: 98.4 F | RESPIRATION RATE: 16 BRPM | HEART RATE: 99 BPM | BODY MASS INDEX: 27.31 KG/M2 | OXYGEN SATURATION: 100 % | WEIGHT: 160 LBS | DIASTOLIC BLOOD PRESSURE: 114 MMHG | HEIGHT: 64 IN

## 2025-06-26 DIAGNOSIS — S60.222A CONTUSION OF LEFT HAND, INITIAL ENCOUNTER: ICD-10-CM

## 2025-06-26 DIAGNOSIS — K02.9 PAIN DUE TO DENTAL CARIES: Primary | ICD-10-CM

## 2025-06-26 PROCEDURE — 99283 EMERGENCY DEPT VISIT LOW MDM: CPT

## 2025-06-26 PROCEDURE — 6370000000 HC RX 637 (ALT 250 FOR IP): Performed by: EMERGENCY MEDICINE

## 2025-06-26 RX ORDER — ACETAMINOPHEN 500 MG
1000 TABLET ORAL EVERY 8 HOURS PRN
Qty: 360 TABLET | Refills: 1 | Status: SHIPPED | OUTPATIENT
Start: 2025-06-26

## 2025-06-26 RX ORDER — IBUPROFEN 200 MG
400 TABLET ORAL EVERY 8 HOURS PRN
Qty: 20 TABLET | Refills: 0 | Status: SHIPPED | OUTPATIENT
Start: 2025-06-26 | End: 2025-07-03

## 2025-06-26 RX ORDER — AMOXICILLIN 500 MG/1
500 CAPSULE ORAL
Status: COMPLETED | OUTPATIENT
Start: 2025-06-26 | End: 2025-06-26

## 2025-06-26 RX ORDER — IBUPROFEN 800 MG/1
800 TABLET, FILM COATED ORAL
Status: COMPLETED | OUTPATIENT
Start: 2025-06-26 | End: 2025-06-26

## 2025-06-26 RX ORDER — LIDOCAINE HYDROCHLORIDE 20 MG/ML
15 SOLUTION OROPHARYNGEAL
Status: COMPLETED | OUTPATIENT
Start: 2025-06-26 | End: 2025-06-26

## 2025-06-26 RX ORDER — AMOXICILLIN 500 MG/1
500 CAPSULE ORAL 3 TIMES DAILY
Qty: 21 CAPSULE | Refills: 0 | Status: SHIPPED | OUTPATIENT
Start: 2025-06-26 | End: 2025-07-03

## 2025-06-26 RX ORDER — ACETAMINOPHEN 500 MG
1000 TABLET ORAL
Status: COMPLETED | OUTPATIENT
Start: 2025-06-26 | End: 2025-06-26

## 2025-06-26 RX ORDER — DIPHENHYDRAMINE HCL 25 MG
50 CAPSULE ORAL ONCE
Status: COMPLETED | OUTPATIENT
Start: 2025-06-26 | End: 2025-06-26

## 2025-06-26 RX ORDER — CHLORHEXIDINE GLUCONATE ORAL RINSE 1.2 MG/ML
15 SOLUTION DENTAL 2 TIMES DAILY
Qty: 420 ML | Refills: 0 | Status: SHIPPED | OUTPATIENT
Start: 2025-06-26 | End: 2025-07-10

## 2025-06-26 RX ADMIN — LIDOCAINE HYDROCHLORIDE 15 ML: 20 SOLUTION ORAL at 23:35

## 2025-06-26 RX ADMIN — AMOXICILLIN 500 MG: 500 CAPSULE ORAL at 23:35

## 2025-06-26 RX ADMIN — ACETAMINOPHEN 1000 MG: 500 TABLET ORAL at 23:35

## 2025-06-26 RX ADMIN — DIPHENHYDRAMINE HYDROCHLORIDE 50 MG: 25 CAPSULE ORAL at 23:35

## 2025-06-26 RX ADMIN — IBUPROFEN 800 MG: 800 TABLET, FILM COATED ORAL at 23:35

## 2025-06-26 ASSESSMENT — PAIN - FUNCTIONAL ASSESSMENT: PAIN_FUNCTIONAL_ASSESSMENT: 0-10

## 2025-06-26 ASSESSMENT — LIFESTYLE VARIABLES
HOW MANY STANDARD DRINKS CONTAINING ALCOHOL DO YOU HAVE ON A TYPICAL DAY: PATIENT DOES NOT DRINK
HOW OFTEN DO YOU HAVE A DRINK CONTAINING ALCOHOL: NEVER

## 2025-06-26 ASSESSMENT — PAIN SCALES - GENERAL: PAINLEVEL_OUTOF10: 9

## 2025-06-27 NOTE — ED TRIAGE NOTES
Patient reports dental pain x2 days. She also states she fell this morning when she was running to get water and injured her L arm and hand.

## 2025-06-27 NOTE — ED PROVIDER NOTES
[facility] EMERGENCY DEPT  EMERGENCY DEPARTMENT HISTORY AND PHYSICAL EXAM      Date: 2025  Patient Name: Martita Arellano  MRN: [id number]  Birthdate 1990  Date of evaluation: 2025  Provider: Migdalia Leon MD  Note Started: 11:36 PM EDT 25    HISTORY OF PRESENT ILLNESS     Chief Complaint   Patient presents with    Dental Pain    Arm Pain       History Provided By: Patient    HPI: Martita Arellano is a 34 y.o. female.  Patient presents with complaint of frontal dental pain for last several days.  Patient is waiting for a dental appointment for extraction of the tooth.  Patient stated she had a ground-level fall this morning when she tripped and fell with outstretched arm.  Patient complains of left hand and left forearm pain.  No paresthesia or motor deficit.  No head injury.  No LOC.  No OTC medications        PAST MEDICAL HISTORY   Past Medical History:  Past Medical History:   Diagnosis Date    Carpal tunnel syndrome of left wrist     Hypertension        Past Surgical History:  Past Surgical History:   Procedure Laterality Date     SECTION, LOW TRANSVERSE N/A     x 5       Family History:  Family History   Problem Relation Age of Onset    Hypertension Mother     Diabetes Maternal Grandmother        Social History:  Social History     Tobacco Use    Smoking status: Every Day     Current packs/day: 0.50     Types: Cigarettes    Smokeless tobacco: Never   Vaping Use    Vaping status: Never Used   Substance Use Topics    Alcohol use: Yes    Drug use: Yes     Types: Marijuana (Weed)       Allergies:  Allergies   Allergen Reactions    Tomato Hives       PCP: None, None    Current Meds:   No current facility-administered medications for this encounter.     Current Outpatient Medications   Medication Sig Dispense Refill    ibuprofen (ADVIL) 200 MG tablet Take 2 tablets by mouth every 8 hours as needed for Pain 20 tablet 0    acetaminophen (TYLENOL) 500 MG tablet Take 2 tablets by mouth every 8

## 2025-07-17 ENCOUNTER — HOSPITAL ENCOUNTER (EMERGENCY)
Facility: HOSPITAL | Age: 35
Discharge: HOME | End: 2025-07-17
Attending: STUDENT IN AN ORGANIZED HEALTH CARE EDUCATION/TRAINING PROGRAM
Payer: MEDICAID

## 2025-07-17 VITALS
BODY MASS INDEX: 28.17 KG/M2 | TEMPERATURE: 97.9 F | SYSTOLIC BLOOD PRESSURE: 188 MMHG | HEART RATE: 95 BPM | RESPIRATION RATE: 17 BRPM | DIASTOLIC BLOOD PRESSURE: 100 MMHG | HEIGHT: 64 IN | WEIGHT: 165 LBS | OXYGEN SATURATION: 100 %

## 2025-07-17 DIAGNOSIS — L30.9 DERMATITIS: ICD-10-CM

## 2025-07-17 DIAGNOSIS — G56.02 CARPAL TUNNEL SYNDROME OF LEFT WRIST: Primary | ICD-10-CM

## 2025-07-17 PROCEDURE — 99283 EMERGENCY DEPT VISIT LOW MDM: CPT

## 2025-07-17 PROCEDURE — 6370000000 HC RX 637 (ALT 250 FOR IP): Performed by: STUDENT IN AN ORGANIZED HEALTH CARE EDUCATION/TRAINING PROGRAM

## 2025-07-17 RX ORDER — IBUPROFEN 600 MG/1
600 TABLET, FILM COATED ORAL
Status: COMPLETED | OUTPATIENT
Start: 2025-07-17 | End: 2025-07-17

## 2025-07-17 RX ORDER — IBUPROFEN 600 MG/1
600 TABLET, FILM COATED ORAL 3 TIMES DAILY PRN
Qty: 30 TABLET | Refills: 0 | Status: SHIPPED | OUTPATIENT
Start: 2025-07-17

## 2025-07-17 RX ORDER — PREDNISONE 20 MG/1
60 TABLET ORAL
Status: COMPLETED | OUTPATIENT
Start: 2025-07-17 | End: 2025-07-17

## 2025-07-17 RX ORDER — PREDNISONE 50 MG/1
50 TABLET ORAL DAILY
Qty: 5 TABLET | Refills: 0 | Status: SHIPPED | OUTPATIENT
Start: 2025-07-17 | End: 2025-07-22

## 2025-07-17 RX ORDER — CYCLOBENZAPRINE HCL 10 MG
10 TABLET ORAL NIGHTLY PRN
Qty: 30 TABLET | Refills: 0 | Status: SHIPPED | OUTPATIENT
Start: 2025-07-17 | End: 2025-07-27

## 2025-07-17 RX ADMIN — IBUPROFEN 600 MG: 600 TABLET ORAL at 14:39

## 2025-07-17 RX ADMIN — PREDNISONE 60 MG: 20 TABLET ORAL at 14:39

## 2025-07-17 ASSESSMENT — PAIN - FUNCTIONAL ASSESSMENT: PAIN_FUNCTIONAL_ASSESSMENT: 0-10

## 2025-07-17 ASSESSMENT — PAIN SCALES - GENERAL: PAINLEVEL_OUTOF10: 7

## 2025-07-17 NOTE — ED PROVIDER NOTES
[facility] EMERGENCY DEPT  EMERGENCY DEPARTMENT HISTORY AND PHYSICAL EXAM      Date of evaluation: 2025  Patient Name: Martita Arellano  Birthdate 1990  MRN: [id number]  ED Provider: Daniel Quijano MD   Note Started: 2:36 PM EDT 25    HISTORY OF PRESENT ILLNESS     Chief Complaint   Patient presents with    Wrist Pain       History Provided By: Patient, only     HPI: Martita Arellano is a 34 y.o. female presents to the emerged primary for left wrist pain.  Patient states she has a history of carpal tunnel syndromes, works with her hands, states that last night she started feeling worsening pain to her left wrist, worse with any flexion extension of wrist, mild tingling to 4th and 5th digit.  No note of any recent injuries denies any  strength deficit.  Patient not take any medication for pain.  Additionally complaining of some itchy bumps to her fingers, states she has a history of eczema and dermatitis, no new medications or foods.    PAST MEDICAL HISTORY   Past Medical History:  Past Medical History:   Diagnosis Date    Carpal tunnel syndrome of left wrist     Hypertension        Past Surgical History:  Past Surgical History:   Procedure Laterality Date     SECTION, LOW TRANSVERSE N/A     x 5       Family History:  Family History   Problem Relation Age of Onset    Hypertension Mother     Diabetes Maternal Grandmother        Social History:  Social History     Tobacco Use    Smoking status: Every Day     Current packs/day: 0.50     Types: Cigarettes    Smokeless tobacco: Never   Vaping Use    Vaping status: Never Used   Substance Use Topics    Alcohol use: Yes    Drug use: Yes     Types: Marijuana (Weed)       Allergies:  Allergies   Allergen Reactions    Tomato Hives       PCP: None, None    Current Meds:   No current facility-administered medications for this encounter.     Current Outpatient Medications   Medication Sig Dispense Refill    predniSONE (DELTASONE) 50 MG tablet Take 1 tablet by mouth

## 2025-09-03 ENCOUNTER — HOSPITAL ENCOUNTER (EMERGENCY)
Facility: HOSPITAL | Age: 35
Discharge: HOME | End: 2025-09-03
Attending: EMERGENCY MEDICINE
Payer: MEDICAID

## 2025-09-03 VITALS
RESPIRATION RATE: 16 BRPM | WEIGHT: 245 LBS | BODY MASS INDEX: 41.83 KG/M2 | DIASTOLIC BLOOD PRESSURE: 84 MMHG | SYSTOLIC BLOOD PRESSURE: 114 MMHG | HEART RATE: 104 BPM | TEMPERATURE: 98.2 F | OXYGEN SATURATION: 100 % | HEIGHT: 64 IN

## 2025-09-03 DIAGNOSIS — R20.2 PARESTHESIA: Primary | ICD-10-CM

## 2025-09-03 PROCEDURE — 6370000000 HC RX 637 (ALT 250 FOR IP): Performed by: EMERGENCY MEDICINE

## 2025-09-03 PROCEDURE — 99283 EMERGENCY DEPT VISIT LOW MDM: CPT

## 2025-09-03 RX ORDER — ACETAMINOPHEN 500 MG
1000 TABLET ORAL EVERY 8 HOURS PRN
Qty: 360 TABLET | Refills: 1 | Status: SHIPPED | OUTPATIENT
Start: 2025-09-03

## 2025-09-03 RX ORDER — IBUPROFEN 200 MG
400 TABLET ORAL EVERY 8 HOURS PRN
Qty: 20 TABLET | Refills: 0 | Status: SHIPPED | OUTPATIENT
Start: 2025-09-03 | End: 2025-09-10

## 2025-09-03 RX ORDER — IBUPROFEN 400 MG/1
400 TABLET, FILM COATED ORAL
Status: COMPLETED | OUTPATIENT
Start: 2025-09-03 | End: 2025-09-03

## 2025-09-03 RX ORDER — PREDNISONE 20 MG/1
60 TABLET ORAL
Status: COMPLETED | OUTPATIENT
Start: 2025-09-03 | End: 2025-09-03

## 2025-09-03 RX ORDER — PREDNISONE 20 MG/1
TABLET ORAL
Qty: 12 TABLET | Refills: 1 | Status: SHIPPED | OUTPATIENT
Start: 2025-09-03

## 2025-09-03 RX ORDER — ACETAMINOPHEN 500 MG
1000 TABLET ORAL
Status: COMPLETED | OUTPATIENT
Start: 2025-09-03 | End: 2025-09-03

## 2025-09-03 RX ADMIN — PREDNISONE 60 MG: 20 TABLET ORAL at 18:24

## 2025-09-03 RX ADMIN — IBUPROFEN 400 MG: 400 TABLET, FILM COATED ORAL at 18:24

## 2025-09-03 RX ADMIN — ACETAMINOPHEN 1000 MG: 500 TABLET ORAL at 18:24

## 2025-09-03 ASSESSMENT — PAIN - FUNCTIONAL ASSESSMENT: PAIN_FUNCTIONAL_ASSESSMENT: 0-10

## 2025-09-03 ASSESSMENT — PAIN SCALES - GENERAL: PAINLEVEL_OUTOF10: 7
